# Patient Record
Sex: MALE | Race: WHITE | NOT HISPANIC OR LATINO
[De-identification: names, ages, dates, MRNs, and addresses within clinical notes are randomized per-mention and may not be internally consistent; named-entity substitution may affect disease eponyms.]

---

## 2020-09-17 ENCOUNTER — APPOINTMENT (OUTPATIENT)
Dept: ORTHOPEDIC SURGERY | Facility: CLINIC | Age: 70
End: 2020-09-17
Payer: MEDICARE

## 2020-09-17 VITALS
HEIGHT: 70 IN | BODY MASS INDEX: 30.06 KG/M2 | HEART RATE: 64 BPM | TEMPERATURE: 97.4 F | OXYGEN SATURATION: 99 % | SYSTOLIC BLOOD PRESSURE: 143 MMHG | DIASTOLIC BLOOD PRESSURE: 80 MMHG | WEIGHT: 210 LBS

## 2020-09-17 DIAGNOSIS — M25.551 PAIN IN RIGHT HIP: ICD-10-CM

## 2020-09-17 DIAGNOSIS — S73.191A OTHER SPRAIN OF RIGHT HIP, INITIAL ENCOUNTER: ICD-10-CM

## 2020-09-17 PROCEDURE — 99203 OFFICE O/P NEW LOW 30 MIN: CPT

## 2020-09-17 NOTE — DISCUSSION/SUMMARY
[Medication Risks Reviewed] : Medication risks reviewed [de-identified] : The patient is a 69 year old man with history of left knee OA presenting with a two month history of atraumatic, right medial hip/groin pain with activity.  His intrarticular impingement signs are positive.  He defers xrays today.  He likely has minor labral injury.  Cannot rule out underlying Hip OA.\par \par The patient was counseled on the natural progression of the problem today.  The patient was provided reassurance today.\par \par Patient would like a trial of conservative treatment.\par \par Patient was prescribed a course of physical therapy today.  The patient was also provided some general home exercises.  The patient was counseled on activity modification.\par \par Follow-up in 4-6 weeks.  If pain persists, we discussed obtaining Xray, possible MRI.\par \par ------------------------------------------------------------------------------------------------------------------\par Patient appreciates and agrees with current plan.\par \par This note was generated using a mixture of manual typing and dragon medical dictation software.  A reasonable effort has been made for proofreading its contents, but typos may still remain.  If there are any questions or points of clarification needed please notify my office.\par \par >30 minutes of time was spent on total encounter.  >50% of the visit was spent on counseling/coordination of care and medical-decision making for this patient.\par \par

## 2020-09-17 NOTE — PHYSICAL EXAM
[de-identified] : General: Well-nourished, well-developed, alert, and in no acute distress.\par Head: Normocephalic.\par Eyes: Pupils equal round reactive to light and accommodation, extraocular muscles intact, normal sclera.\par Nose: No nasal discharge.\par Cardiac: Regular rate. Extremities are warm and well perfused. Distal pulses are symmetric bilaterally.\par Respiratory: No labored breathing.\par Extremities: Sensation is intact distally bilaterally.  Distal pulses are symmetric bilaterally\par Lymphatic: No regional lymphadenopathy, no lymphedema\par : NEGATIVE INGUINAL HERNIA EXAM\par Neurologic: No focal deficits\par Skin: Normal skin color, texture, and turgor\par Psychiatric: Normal affect\par MSK: as noted above/below\par \par \par \par LEFT HIP:\par \par Inspection: no bruising, erythema, rash, deformity \par Palpation: No Greater Trochanter pain , ITB pain , Hip Flexor pain  , Piriformis pain , Proximal Hamstring Pain , Groin pain \par ROM: normal Internal Rotation , External Rotation\par Strength: 5/5 Hip Flexion, Hip Extension, Hip Abduction, Hip Adduction\par \par Distal Pulses: normal\par Lower Extremity Sensation: normal \par Patellar/Achilles Reflex: normal\par \par Special Tests:\par Stinchfield: NEGATIVE \par Log Roll: NEGATIVE\par FABERE: NEGATIVE\par FADIR: NEGATIVE\par Obers: NEGATIVE\par Resisted Sit-Up: NEGATIVE\par \par RIGHT HIP:\par \par Inspection: no bruising, erythema, rash, deformity \par Palpation: No Greater Trochanter pain , ITB pain , Hip Flexor pain  , Piriformis pain , Proximal Hamstring Pain , Groin pain \par ROM: normal Internal Rotation , External Rotation\par Strength: 5/5 Hip Flexion, Hip Extension, Hip Abduction, Hip Adduction\par \par Distal Pulses: normal\par Lower Extremity Sensation: normal \par Patellar/Achilles Reflex: normal\par \par Special Tests:\par Stinchfield: POSITIVE\par Log Roll: NEGATIVE\par FABERE: NEGATIVE\par FADIR: POSITIVE\par Obers: NEGATIVE\par Resisted Sit-Up: NEGATIVE\par \par  [de-identified] : Patient defers xrays today

## 2020-09-17 NOTE — HISTORY OF PRESENT ILLNESS
[de-identified] : The patient is a 69 year old man with history of left knee OA presenting with right groin pain.\par \par The patient presents with a two month history of atraumatic, right medial hip/groin pain.  He recalls feeling a pull at his medial hip when stepping out of his car.  He denies feeling a pop or snap.  The patient denies significant swelling or bruising.  His pain is only with certain hip motion.  He denies snapping.  He denies gait instability.  He has no problem with weightbearing.  He denies GI/ symptoms.  No testicular bulge.\par \par Pain is rated 0/10, described as dull, improved with rest, worse with stretching. [0] : a current pain level of 0/10

## 2021-11-16 ENCOUNTER — APPOINTMENT (OUTPATIENT)
Dept: HEMATOLOGY ONCOLOGY | Facility: CLINIC | Age: 71
End: 2021-11-16